# Patient Record
Sex: FEMALE | Race: OTHER | HISPANIC OR LATINO | URBAN - METROPOLITAN AREA
[De-identification: names, ages, dates, MRNs, and addresses within clinical notes are randomized per-mention and may not be internally consistent; named-entity substitution may affect disease eponyms.]

---

## 2022-07-09 ENCOUNTER — TELEPHONE ENCOUNTER (OUTPATIENT)
Dept: URBAN - METROPOLITAN AREA CLINIC 121 | Facility: CLINIC | Age: 67
End: 2022-07-09

## 2022-07-09 RX ORDER — ESOMEPRAZOLE MAGNESIUM 20 MG/1
CAPSULE, DELAYED RELEASE ORAL TWICE A DAY
Refills: 0 | OUTPATIENT
Start: 2018-07-17 | End: 2018-10-09

## 2022-07-09 RX ORDER — OMEPRAZOLE 40 MG/1
CAPSULE, DELAYED RELEASE ORAL TWICE A DAY
Refills: 0 | OUTPATIENT
Start: 2018-04-26 | End: 2018-04-26

## 2022-07-09 RX ORDER — ESOMEPRAZOLE MAGNESIUM 20 MG/1
TWICE A DAY CAPSULE, DELAYED RELEASE ORAL TWICE A DAY
Refills: 1 | OUTPATIENT
Start: 2018-06-27 | End: 2018-07-17

## 2022-07-09 RX ORDER — SUCRALFATE 1 G/1
TABLET ORAL THREE TIMES A DAY
Refills: 0 | OUTPATIENT
Start: 2018-07-17 | End: 2018-10-09

## 2022-07-09 RX ORDER — OMEPRAZOLE 40 MG/1
CAPSULE, DELAYED RELEASE ORAL TWICE A DAY
Refills: 0 | OUTPATIENT
Start: 2018-03-29 | End: 2018-04-26

## 2022-07-09 RX ORDER — OMEPRAZOLE 40 MG/1
TWICE A DAY CAPSULE, DELAYED RELEASE ORAL TWICE A DAY
Refills: 1 | OUTPATIENT
Start: 2018-01-29 | End: 2018-03-29

## 2022-07-09 RX ORDER — BUSPIRONE HYDROCHLORIDE 10 MG/1
TABLET ORAL TWICE A DAY
Refills: 0 | OUTPATIENT
Start: 2018-01-09 | End: 2018-03-29

## 2022-07-09 RX ORDER — DEXLANSOPRAZOLE 60 MG/1
ONCE A DAY CAPSULE, DELAYED RELEASE ORAL ONCE A DAY
Refills: 3 | OUTPATIENT
Start: 2018-06-21 | End: 2018-07-17

## 2022-07-09 RX ORDER — CLARITHROMYCIN 500 MG/1
TABLET ORAL THREE TIMES A DAY
Refills: 0 | OUTPATIENT
Start: 2018-01-26 | End: 2018-03-29

## 2022-07-09 RX ORDER — BUSPIRONE HYDROCHLORIDE 10 MG/1
TABLET ORAL TWICE A DAY
Refills: 0 | OUTPATIENT
Start: 2018-03-29 | End: 2018-03-29

## 2022-07-09 RX ORDER — OMEPRAZOLE 40 MG/1
CAPSULE, DELAYED RELEASE ORAL
Refills: 0 | OUTPATIENT
Start: 2018-01-26 | End: 2018-01-29

## 2022-07-09 RX ORDER — OMEPRAZOLE 20 MG/1
TWICE A DAY CAPSULE, DELAYED RELEASE ORAL TWICE A DAY
Refills: 2 | OUTPATIENT
Start: 2018-03-29 | End: 2018-04-26

## 2022-07-09 RX ORDER — SUCRALFATE 1 G/1
TWICE A DAY TABLET ORAL TWICE A DAY
Refills: 0 | OUTPATIENT
Start: 2018-03-16 | End: 2018-03-29

## 2022-07-09 RX ORDER — BISMUTH SUBCITRATE POTASSIUM, METRONIDAZOLE, TETRACYCLINE HYDROCHLORIDE 140; 125; 125 MG/1; MG/1; MG/1
TAKE AS DIRECTED CAPSULE ORAL TAKE AS DIRECTED
Refills: 0 | OUTPATIENT
Start: 2018-01-29 | End: 2018-03-29

## 2022-07-09 RX ORDER — PREDNISONE 10 MG/1
TABLET ORAL TWICE A DAY
Refills: 0 | OUTPATIENT
Start: 2018-01-09 | End: 2018-03-29

## 2022-07-10 ENCOUNTER — TELEPHONE ENCOUNTER (OUTPATIENT)
Dept: URBAN - METROPOLITAN AREA CLINIC 121 | Facility: CLINIC | Age: 67
End: 2022-07-10

## 2022-07-10 RX ORDER — SUCRALFATE 1 G/1
FOUR TIMES A DAY TABLET ORAL
Refills: 1 | Status: ACTIVE | COMMUNITY
Start: 2018-04-26

## 2022-07-10 RX ORDER — BISMUTH SUBCITRATE POTASSIUM, METRONIDAZOLE, TETRACYCLINE HYDROCHLORIDE 140; 125; 125 MG/1; MG/1; MG/1
TAKE AS DIRECTED CAPSULE ORAL TAKE AS DIRECTED
Refills: 0 | Status: ACTIVE | COMMUNITY
Start: 2018-02-06

## 2022-07-10 RX ORDER — ESOMEPRAZOLE MAGNESIUM 20 MG/1
TWICE A DAY CAPSULE, DELAYED RELEASE ORAL TWICE A DAY
Refills: 2 | Status: ACTIVE | COMMUNITY
Start: 2018-08-24

## 2022-07-10 RX ORDER — SUCRALFATE 1 G/1
THREE TIMES A DAY TABLET ORAL THREE TIMES A DAY
Refills: 0 | Status: ACTIVE | COMMUNITY
Start: 2018-03-29

## 2023-02-21 ENCOUNTER — OFFICE VISIT (OUTPATIENT)
Dept: URBAN - METROPOLITAN AREA CLINIC 63 | Facility: CLINIC | Age: 68
End: 2023-02-21
Payer: COMMERCIAL

## 2023-02-21 VITALS
BODY MASS INDEX: 23.05 KG/M2 | TEMPERATURE: 97.1 F | HEIGHT: 60 IN | HEART RATE: 83 BPM | DIASTOLIC BLOOD PRESSURE: 80 MMHG | WEIGHT: 117.4 LBS | OXYGEN SATURATION: 99 % | SYSTOLIC BLOOD PRESSURE: 132 MMHG

## 2023-02-21 DIAGNOSIS — K21.9 GERD WITHOUT ESOPHAGITIS: ICD-10-CM

## 2023-02-21 DIAGNOSIS — Z86.010 PERSONAL HISTORY OF COLONIC POLYPS: ICD-10-CM

## 2023-02-21 PROCEDURE — 99204 OFFICE O/P NEW MOD 45 MIN: CPT | Performed by: INTERNAL MEDICINE

## 2023-02-21 RX ORDER — MIRTAZAPINE 30 MG/1
1 TABLET AT BEDTIME TABLET, FILM COATED ORAL ONCE A DAY
Status: ACTIVE | COMMUNITY

## 2023-02-21 RX ORDER — ALPRAZOLAM 1 MG/1
1 TABLET TABLET ORAL ONCE A DAY
Status: ACTIVE | COMMUNITY

## 2023-02-21 RX ORDER — SERTRALINE 25 MG/1
1 TABLET TABLET, FILM COATED ORAL ONCE A DAY
Status: ACTIVE | COMMUNITY

## 2023-02-21 RX ORDER — ONDANSETRON HYDROCHLORIDE 4 MG/1
1 TABLET TABLET, FILM COATED ORAL
Qty: 2 | Refills: 0 | OUTPATIENT
Start: 2023-02-21

## 2023-02-21 NOTE — HPI-SCREENING
10/17 Patient here today in better general state, she said has better appetite, symptoms of gastritis and reflux are better, here today for her surveillance colonoscopy. Patient's  last colonoscopy with evidence of diverticular disease, internal hemorrhoids, 4 hyperplastic polyps and bad preparation. Patient still having CEA elevated with normal  and AFP.Normal lipase, amylase and pancreatic elastase. Patient will continue diet , treatment for gastritis and will have colonoscopy, we went over the colon prep on detail and she said understood. Will repeat CEA in a month. and will do CT abdomen upon it results.     Anti-reflux diet  Reduce caffeine intake  Ordered regular exercise  Abstinence from alcohol  Maintain a healthy diet  Lose weight  Avoid certain foods:  Fatty, fried, greasy,spicy foods  Avoid certain foods: citrus 4/18 Patient  has been loosing lots of weight. She said has appetite but she can not eat, because every time she eats she get epigastric pain.  Also, complaints of constipation. Patient's  colonoscopy with evidence of diverticular disease, internal hemorrhoids, 4 hyperplastic polyps and bad preparation. Esophagram with evidence of mild dysmotility and stasis of barium within the esophagus. ( She denies dysphagia ) Patient had visit [facility] ER department due to this symptoms and had done a CT scan that result in a sub optimal study. Will repeat it upon clinical course and  studies results. We will start Dexilant, Linzess. diet, continue Carafate. Will do SBFT and GES. 7/18 Patient here today in better general state, she said her symptoms of gastritis and reflux are better, are related now with her meals, abdominal pain is better. Constipation is improved with fiber intake. Patient did not complete GES or SBFT. Labs: AFP, CMP, Amylase and lipase are normal. CEA is elevated 9.1 ( Patient smoke ). Plan Will repeat tumor marker  and CEA, will do CT abdomen with contrast upon labs results and clinical status. patient will continue diet and treatment with PPI and Carafate. 2/23: Patient with personal h/o colonic polyps, ast colonoscopy done in 2018, was recommended to repeat colonoscopy in 6 months because of poor preparation, she did not follow up with us until now.  I explained to the patient that I was not able to removed the polyp and she understood, but did not had any follow up with us. Now comes with acid reflux feels better doing diet, will plan a colonoscopy. Patient taking savila, doing better. Will plan EGD and colonoscopy.  patiwent continue smoking, was recommended to stop.

## 2023-02-28 ENCOUNTER — LAB OUTSIDE AN ENCOUNTER (OUTPATIENT)
Dept: URBAN - METROPOLITAN AREA CLINIC 63 | Facility: CLINIC | Age: 68
End: 2023-02-28

## 2023-03-20 ENCOUNTER — TELEPHONE ENCOUNTER (OUTPATIENT)
Dept: URBAN - METROPOLITAN AREA CLINIC 63 | Facility: CLINIC | Age: 68
End: 2023-03-20

## 2023-04-07 ENCOUNTER — OFFICE VISIT (OUTPATIENT)
Dept: URBAN - METROPOLITAN AREA SURGERY CENTER 4 | Facility: SURGERY CENTER | Age: 68
End: 2023-04-07
Payer: COMMERCIAL

## 2023-04-07 DIAGNOSIS — Z86.010 PERSONAL HISTORY OF COLONIC POLYPS: ICD-10-CM

## 2023-04-07 DIAGNOSIS — K21.00 GASTRO-ESOPHAGEAL REFLUX DISEASE WITH ESOPHAGITIS, WITHOUT BLEEDING: ICD-10-CM

## 2023-04-07 DIAGNOSIS — D12.2 POLYP OF ASCENDING COLON: ICD-10-CM

## 2023-04-07 DIAGNOSIS — K63.89 OTHER SPECIFIED DISEASES OF INTESTINE: ICD-10-CM

## 2023-04-07 DIAGNOSIS — D12.3 POLYP OF TRANSVERSE COLON: ICD-10-CM

## 2023-04-07 DIAGNOSIS — K29.50 UNSPECIFIED CHRONIC GASTRITIS WITHOUT BLEEDING: ICD-10-CM

## 2023-04-07 DIAGNOSIS — K62.1 RECTAL POLYP: ICD-10-CM

## 2023-04-07 DIAGNOSIS — R19.8 OTHER SPECIFIED SYMPTOMS AND SIGNS INVOLVING THE DIGESTIVE SYSTEM AND ABDOMEN: ICD-10-CM

## 2023-04-07 PROCEDURE — 43239 EGD BIOPSY SINGLE/MULTIPLE: CPT | Performed by: INTERNAL MEDICINE

## 2023-04-07 PROCEDURE — 45380 COLONOSCOPY AND BIOPSY: CPT | Performed by: INTERNAL MEDICINE

## 2023-04-07 PROCEDURE — 45385 COLONOSCOPY W/LESION REMOVAL: CPT | Performed by: INTERNAL MEDICINE

## 2023-04-07 RX ORDER — SERTRALINE 25 MG/1
1 TABLET TABLET, FILM COATED ORAL ONCE A DAY
Status: ACTIVE | COMMUNITY

## 2023-04-07 RX ORDER — ALPRAZOLAM 1 MG/1
1 TABLET TABLET ORAL ONCE A DAY
Status: ACTIVE | COMMUNITY

## 2023-04-07 RX ORDER — DEXLANSOPRAZOLE 60 MG/1
1 CAPSULE CAPSULE, DELAYED RELEASE ORAL ONCE A DAY
Qty: 30 | OUTPATIENT
Start: 2023-04-07

## 2023-04-07 RX ORDER — MIRTAZAPINE 30 MG/1
1 TABLET AT BEDTIME TABLET, FILM COATED ORAL ONCE A DAY
Status: ACTIVE | COMMUNITY

## 2023-04-07 RX ORDER — ONDANSETRON HYDROCHLORIDE 4 MG/1
1 TABLET TABLET, FILM COATED ORAL
Qty: 2 | Refills: 0 | Status: ACTIVE | COMMUNITY
Start: 2023-02-21

## 2023-04-24 ENCOUNTER — OFFICE VISIT (OUTPATIENT)
Dept: URBAN - METROPOLITAN AREA CLINIC 63 | Facility: CLINIC | Age: 68
End: 2023-04-24
Payer: COMMERCIAL

## 2023-04-24 VITALS
WEIGHT: 114.8 LBS | SYSTOLIC BLOOD PRESSURE: 130 MMHG | OXYGEN SATURATION: 99 % | DIASTOLIC BLOOD PRESSURE: 70 MMHG | HEART RATE: 83 BPM | TEMPERATURE: 97.3 F | HEIGHT: 60 IN | BODY MASS INDEX: 22.54 KG/M2

## 2023-04-24 DIAGNOSIS — K21.9 GERD WITHOUT ESOPHAGITIS: ICD-10-CM

## 2023-04-24 DIAGNOSIS — K44.9 HIATAL HERNIA: ICD-10-CM

## 2023-04-24 DIAGNOSIS — K22.70 BARRETT'S ESOPHAGUS WITHOUT DYSPLASIA: ICD-10-CM

## 2023-04-24 DIAGNOSIS — K59.04 CHRONIC IDIOPATHIC CONSTIPATION: ICD-10-CM

## 2023-04-24 DIAGNOSIS — K64.1 GRADE II HEMORRHOIDS: ICD-10-CM

## 2023-04-24 DIAGNOSIS — D12.6 ADENOMATOUS POLYP OF COLON, UNSPECIFIED PART OF COLON: ICD-10-CM

## 2023-04-24 PROBLEM — 82934008: Status: ACTIVE | Noted: 2023-04-24

## 2023-04-24 PROBLEM — 302914006: Status: ACTIVE | Noted: 2023-04-24

## 2023-04-24 PROCEDURE — 99214 OFFICE O/P EST MOD 30 MIN: CPT | Performed by: NURSE PRACTITIONER

## 2023-04-24 RX ORDER — OMEPRAZOLE 20 MG/1
1 CAPSULE 30 MINUTES BEFORE MORNING MEAL CAPSULE, DELAYED RELEASE ORAL ONCE A DAY
Qty: 90 CAPSULES | Refills: 0 | OUTPATIENT
Start: 2023-04-24

## 2023-04-24 RX ORDER — SERTRALINE 25 MG/1
1 TABLET TABLET, FILM COATED ORAL ONCE A DAY
Status: ACTIVE | COMMUNITY

## 2023-04-24 RX ORDER — ALPRAZOLAM 1 MG/1
1 TABLET TABLET ORAL ONCE A DAY
Status: ACTIVE | COMMUNITY

## 2023-04-24 RX ORDER — SUCRALFATE 1 G/1
1 TABLET ON AN EMPTY STOMACH TABLET ORAL TWICE A DAY
Qty: 180 TABLET | Refills: 0 | OUTPATIENT
Start: 2023-04-24 | End: 2023-05-24

## 2023-04-24 RX ORDER — MIRTAZAPINE 30 MG/1
1 TABLET AT BEDTIME TABLET, FILM COATED ORAL ONCE A DAY
Status: ACTIVE | COMMUNITY

## 2023-04-24 RX ORDER — ONDANSETRON HYDROCHLORIDE 4 MG/1
1 TABLET TABLET, FILM COATED ORAL
Qty: 2 | Refills: 0 | Status: ACTIVE | COMMUNITY
Start: 2023-02-21

## 2023-04-24 NOTE — HPI-SCREENING
10/17 Patient here today in better general state, she said has better appetite, symptoms of gastritis and reflux are better, here today for her surveillance colonoscopy. Patient's  last colonoscopy with evidence of diverticular disease, internal hemorrhoids, 4 hyperplastic polyps and bad preparation. Patient still having CEA elevated with normal  and AFP.Normal lipase, amylase and pancreatic elastase. Patient will continue diet, treatment for gastritis and will have colonoscopy, we went over the colon prep on detail, and she said understood. Will repeat CEA in a month. And will do CT abdomen upon it results.     Anti-reflux diet  Reduce caffeine intake  Ordered regular exercise  Abstinence from alcohol  Maintain a healthy diet  Lose weight  Avoid certain foods:  Fatty, fried, greasy, spicy foods  Avoid certain foods: citrus 4/18 Patient  has been loosing lots of weight. She said has appetite, but she can not eat, because every time she eats she gets epigastric pain.  Also, complaints of constipation. Patient's  colonoscopy with evidence of diverticular disease, internal hemorrhoids, 4 hyperplastic polyps and bad preparation. Esophagram with evidence of mild dysmotility and stasis of barium within the esophagus. (She denies dysphagia) Patient had visit [facility] ER department due to these symptoms and had done a CT scan that result in a suboptimal study. Will repeat it upon clinical course and  studies results. We will start Dexilant, Linzess. Diet, continue Carafate. Will do SBFT and GES. 7/18 Patient here today in better general state, she said her symptoms of gastritis and reflux are better, are related now with her meals, abdominal pain is better. Constipation is improved with fiber intake. Patient did not complete GES or SBFT. Labs: AFP, CMP, Amylase and lipase are normal. CEA is elevated 9.1 (Patient smoke). Plan Will repeat tumor marker  and CEA, will do CT abdomen with contrast upon labs results and clinical status. Patient will continue diet and treatment with PPI and Carafate. 2/23: Patient with personal h/o colonic polyps, last colonoscopy done in 2018, was recommended to repeat colonoscopy in 6 months because of poor preparation, she did not follow up with us until now.  I explained to the patient that I was not able to remove the polyp, and she understood, but did not have any follow up with us. Now comes with acid reflux feels better doing diet, will plan a colonoscopy. Patient taking savila, doing better. Will plan EGD and colonoscopy.  Patient continue smoking, was recommended to stop. 4/23 Patient's EGD shows evidence of an 8 mm sessile serrated polyp into the ascending colon, a 6 mm hyperplastic polyp into the transverse colon, a 5 mm sessile serrated polyp into the descending colon, four 5 mm hyperplastic polyp into the rectum, internal hemorrhoids, melanosis in the colon, and stool in the entire examined colon.  EGD shows evidence of LA grade a reflux esophagitis.  Small hiatal hernia.  Chronic gastritis.  Normal upper third esophagus and middle third esophagus, normal examined duodenum.  Biopsy results shows evidence of duodenal mucosa with no histologic changes, Stomach antrum/body biopsy negative for H. pylori.  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  EGD surveillance in 1 year.  Patient will continue with diet and treatment for gastritis. Stop tabaco use and diet.

## 2023-04-27 ENCOUNTER — TELEPHONE ENCOUNTER (OUTPATIENT)
Dept: URBAN - METROPOLITAN AREA CLINIC 64 | Facility: CLINIC | Age: 68
End: 2023-04-27

## 2023-06-01 ENCOUNTER — TELEPHONE ENCOUNTER (OUTPATIENT)
Dept: URBAN - METROPOLITAN AREA CLINIC 63 | Facility: CLINIC | Age: 68
End: 2023-06-01

## 2023-06-02 ENCOUNTER — OUT OF OFFICE VISIT (OUTPATIENT)
Dept: URBAN - METROPOLITAN AREA SURGERY CENTER 4 | Facility: SURGERY CENTER | Age: 68
End: 2023-06-02
Payer: COMMERCIAL

## 2023-06-02 DIAGNOSIS — Z86.010 PERSONAL HISTORY OF COLONIC POLYPS: ICD-10-CM

## 2023-06-02 PROCEDURE — 00812 ANES LWR INTST SCR COLSC: CPT | Performed by: NURSE ANESTHETIST, CERTIFIED REGISTERED

## 2023-06-02 PROCEDURE — G0105 COLORECTAL SCRN; HI RISK IND: HCPCS | Performed by: INTERNAL MEDICINE

## 2023-06-02 RX ORDER — OMEPRAZOLE 20 MG/1
1 CAPSULE 30 MINUTES BEFORE MORNING MEAL CAPSULE, DELAYED RELEASE ORAL ONCE A DAY
Qty: 90 CAPSULES | Refills: 0 | Status: ACTIVE | COMMUNITY
Start: 2023-04-24

## 2023-06-02 RX ORDER — ALPRAZOLAM 1 MG/1
1 TABLET TABLET ORAL ONCE A DAY
Status: ACTIVE | COMMUNITY

## 2023-06-02 RX ORDER — SERTRALINE 25 MG/1
1 TABLET TABLET, FILM COATED ORAL ONCE A DAY
Status: ACTIVE | COMMUNITY

## 2023-06-02 RX ORDER — ONDANSETRON HYDROCHLORIDE 4 MG/1
1 TABLET TABLET, FILM COATED ORAL
Qty: 2 | Refills: 0 | Status: ACTIVE | COMMUNITY
Start: 2023-02-21

## 2023-06-02 RX ORDER — MIRTAZAPINE 30 MG/1
1 TABLET AT BEDTIME TABLET, FILM COATED ORAL ONCE A DAY
Status: ACTIVE | COMMUNITY

## 2023-06-28 ENCOUNTER — TELEPHONE ENCOUNTER (OUTPATIENT)
Dept: URBAN - METROPOLITAN AREA CLINIC 63 | Facility: CLINIC | Age: 68
End: 2023-06-28

## 2023-07-03 ENCOUNTER — OFFICE VISIT (OUTPATIENT)
Dept: URBAN - METROPOLITAN AREA CLINIC 63 | Facility: CLINIC | Age: 68
End: 2023-07-03
Payer: COMMERCIAL

## 2023-07-03 ENCOUNTER — OFFICE VISIT (OUTPATIENT)
Dept: URBAN - METROPOLITAN AREA CLINIC 63 | Facility: CLINIC | Age: 68
End: 2023-07-03

## 2023-07-03 VITALS
OXYGEN SATURATION: 98 % | WEIGHT: 110.4 LBS | BODY MASS INDEX: 21.68 KG/M2 | TEMPERATURE: 96.5 F | SYSTOLIC BLOOD PRESSURE: 138 MMHG | HEART RATE: 81 BPM | DIASTOLIC BLOOD PRESSURE: 82 MMHG | HEIGHT: 60 IN

## 2023-07-03 DIAGNOSIS — K59.04 CHRONIC IDIOPATHIC CONSTIPATION: ICD-10-CM

## 2023-07-03 DIAGNOSIS — K21.9 GERD WITHOUT ESOPHAGITIS: ICD-10-CM

## 2023-07-03 DIAGNOSIS — Z86.010 PERSONAL HISTORY OF COLONIC POLYPS: ICD-10-CM

## 2023-07-03 DIAGNOSIS — K44.9 HIATAL HERNIA: ICD-10-CM

## 2023-07-03 PROBLEM — 266433003: Status: ACTIVE | Noted: 2023-07-03

## 2023-07-03 PROBLEM — 84089009: Status: ACTIVE | Noted: 2023-07-03

## 2023-07-03 PROCEDURE — 99214 OFFICE O/P EST MOD 30 MIN: CPT | Performed by: INTERNAL MEDICINE

## 2023-07-03 RX ORDER — MIRTAZAPINE 30 MG/1
1 TABLET AT BEDTIME TABLET, FILM COATED ORAL ONCE A DAY
Status: ACTIVE | COMMUNITY

## 2023-07-03 RX ORDER — SUCRALFATE 1 G/1
1 TABLET ON AN EMPTY STOMACH TABLET ORAL TWICE A DAY
Status: ACTIVE | COMMUNITY

## 2023-07-03 RX ORDER — ALPRAZOLAM 1 MG/1
1 TABLET TABLET ORAL ONCE A DAY
Status: ACTIVE | COMMUNITY

## 2023-07-03 RX ORDER — OMEPRAZOLE 20 MG/1
1 CAPSULE 30 MINUTES BEFORE MORNING MEAL CAPSULE, DELAYED RELEASE ORAL ONCE A DAY
Qty: 90 CAPSULES | Refills: 0 | Status: ACTIVE | COMMUNITY
Start: 2023-04-24

## 2023-07-03 RX ORDER — OMEPRAZOLE 20 MG/1
1 CAPSULE 30 MINUTES BEFORE MORNING MEAL CAPSULE, DELAYED RELEASE ORAL ONCE A DAY
Qty: 30 | Refills: 1 | OUTPATIENT
Start: 2023-04-24

## 2023-07-03 RX ORDER — SUCRALFATE 1 G/1
1 TABLET ON AN EMPTY STOMACH TABLET ORAL TWICE A DAY
Qty: 60 | Refills: 3 | OUTPATIENT

## 2023-07-03 RX ORDER — SERTRALINE 25 MG/1
1 TABLET TABLET, FILM COATED ORAL ONCE A DAY
Status: ACTIVE | COMMUNITY

## 2023-07-03 NOTE — HPI-TODAY'S VISIT:
7/23: Patient feels better, is eating better, with treatment for reflux doing well. Will continue diet and will plan EGD in one year. Will need to continue sucralfate and will use PPI prn. Patient with rare constipation is doing better will conitnue fiber and water. Patient had a por preparation and had an incomplte removal of the ascending polyp will need to do a colonoscopy ASAP.

## 2023-07-03 NOTE — HPI-SCREENING
10/17 Patient here today in better general state, she said has better appetite, symptoms of gastritis and reflux are better, here today for her surveillance colonoscopy. Patient's  last colonoscopy with evidence of diverticular disease, internal hemorrhoids, 4 hyperplastic polyps and bad preparation. Patient still having CEA elevated with normal  and AFP.Normal lipase, amylase and pancreatic elastase. Patient will continue diet, treatment for gastritis and will have colonoscopy, we went over the colon prep on detail, and she said understood. Will repeat CEA in a month. And will do CT abdomen upon it results.     Anti-reflux diet  Reduce caffeine intake  Ordered regular exercise  Abstinence from alcohol  Maintain a healthy diet  Lose weight  Avoid certain foods:  Fatty, fried, greasy, spicy foods  Avoid certain foods: citrus 4/18 Patient  has been loosing lots of weight. She said has appetite, but she can not eat, because every time she eats she gets epigastric pain.  Also, complaints of constipation. Patient's  colonoscopy with evidence of diverticular disease, internal hemorrhoids, 4 hyperplastic polyps and bad preparation. Esophagram with evidence of mild dysmotility and stasis of barium within the esophagus. (She denies dysphagia) Patient had visit [facility] ER department due to these symptoms and had done a CT scan that result in a suboptimal study. Will repeat it upon clinical course and  studies results. We will start Dexilant, Linzess. Diet, continue Carafate. Will do SBFT and GES. 7/18 Patient here today in better general state, she said her symptoms of gastritis and reflux are better, are related now with her meals, abdominal pain is better. Constipation is improved with fiber intake. Patient did not complete GES or SBFT. Labs: AFP, CMP, Amylase and lipase are normal. CEA is elevated 9.1 (Patient smoke). Plan Will repeat tumor marker  and CEA, will do CT abdomen with contrast upon labs results and clinical status. Patient will continue diet and treatment with PPI and Carafate. 2/23: Patient with personal h/o colonic polyps, last colonoscopy done in 2018, was recommended to repeat colonoscopy in 6 months because of poor preparation, she did not follow up with us until now.  I explained to the patient that I was not able to remove the polyp, and she understood, but did not have any follow up with us. Now comes with acid reflux feels better doing diet, will plan a colonoscopy. Patient taking savila, doing better. Will plan EGD and colonoscopy.  Patient continue smoking, was recommended to stop. 4/23 Patient's colonoscopy shows evidence of an 8 mm sessile serrated polyp into the ascending colon, a 6 mm hyperplastic polyp into the transverse colon, a 5 mm sessile serrated polyp into the descending colon, four 5 mm hyperplastic polyp into the rectum, internal hemorrhoids, melanosis in the colon, and stool in the entire examined colon.  EGD shows evidence of LA grade a reflux esophagitis.  Small hiatal hernia.  Chronic gastritis.  Normal upper third esophagus and middle third esophagus, normal examined duodenum.  Biopsy results shows evidence of duodenal mucosa with no histologic changes, Stomach antrum/body biopsy negative for H. pylori.  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  EGD surveillance in 1 year.  Patient will continue with diet and treatment for gastritis. Stop tabaco use and diet.

## 2023-07-10 ENCOUNTER — LAB OUTSIDE AN ENCOUNTER (OUTPATIENT)
Dept: URBAN - METROPOLITAN AREA CLINIC 63 | Facility: CLINIC | Age: 68
End: 2023-07-10

## 2023-07-12 ENCOUNTER — TELEPHONE ENCOUNTER (OUTPATIENT)
Dept: URBAN - METROPOLITAN AREA CLINIC 63 | Facility: CLINIC | Age: 68
End: 2023-07-12

## 2023-07-12 RX ORDER — OMEPRAZOLE 20 MG/1
1 CAPSULE 30 MINUTES BEFORE MORNING MEAL CAPSULE, DELAYED RELEASE ORAL ONCE A DAY
Qty: 90 | Refills: 0
Start: 2023-04-24

## 2023-08-25 ENCOUNTER — OFFICE VISIT (OUTPATIENT)
Dept: URBAN - METROPOLITAN AREA SURGERY CENTER 4 | Facility: SURGERY CENTER | Age: 68
End: 2023-08-25

## 2023-08-25 ENCOUNTER — TELEPHONE ENCOUNTER (OUTPATIENT)
Dept: URBAN - METROPOLITAN AREA CLINIC 63 | Facility: CLINIC | Age: 68
End: 2023-08-25

## 2023-08-25 RX ORDER — OMEPRAZOLE 20 MG/1
1 CAPSULE 30 MINUTES BEFORE MORNING MEAL CAPSULE, DELAYED RELEASE ORAL ONCE A DAY
Qty: 90 | Refills: 0 | Status: ACTIVE | COMMUNITY
Start: 2023-04-24

## 2023-08-25 RX ORDER — SERTRALINE 25 MG/1
1 TABLET TABLET, FILM COATED ORAL ONCE A DAY
Status: ACTIVE | COMMUNITY

## 2023-08-25 RX ORDER — SUCRALFATE 1 G/1
1 TABLET ON AN EMPTY STOMACH TABLET ORAL TWICE A DAY
Qty: 60 | Refills: 3 | Status: ACTIVE | COMMUNITY

## 2023-08-25 RX ORDER — MIRTAZAPINE 30 MG/1
1 TABLET AT BEDTIME TABLET, FILM COATED ORAL ONCE A DAY
Status: ACTIVE | COMMUNITY

## 2023-08-25 RX ORDER — ALPRAZOLAM 1 MG/1
1 TABLET TABLET ORAL ONCE A DAY
Status: ACTIVE | COMMUNITY

## 2023-09-06 ENCOUNTER — OFFICE VISIT (OUTPATIENT)
Dept: URBAN - METROPOLITAN AREA CLINIC 63 | Facility: CLINIC | Age: 68
End: 2023-09-06
Payer: COMMERCIAL

## 2023-09-06 ENCOUNTER — DASHBOARD ENCOUNTERS (OUTPATIENT)
Age: 68
End: 2023-09-06

## 2023-09-06 VITALS
SYSTOLIC BLOOD PRESSURE: 110 MMHG | BODY MASS INDEX: 21.01 KG/M2 | DIASTOLIC BLOOD PRESSURE: 72 MMHG | WEIGHT: 107 LBS | TEMPERATURE: 97 F | HEIGHT: 60 IN | HEART RATE: 69 BPM | OXYGEN SATURATION: 98 %

## 2023-09-06 DIAGNOSIS — D12.6 ADENOMATOUS POLYP OF COLON, UNSPECIFIED PART OF COLON: ICD-10-CM

## 2023-09-06 DIAGNOSIS — K21.9 GERD WITHOUT ESOPHAGITIS: ICD-10-CM

## 2023-09-06 DIAGNOSIS — K22.70 BARRETT'S ESOPHAGUS WITHOUT DYSPLASIA: ICD-10-CM

## 2023-09-06 DIAGNOSIS — K59.04 CHRONIC IDIOPATHIC CONSTIPATION: ICD-10-CM

## 2023-09-06 DIAGNOSIS — K59.00 CONSTIPATION, UNSPECIFIED: ICD-10-CM

## 2023-09-06 PROBLEM — 428054006: Status: ACTIVE | Noted: 2023-09-06

## 2023-09-06 PROCEDURE — 99214 OFFICE O/P EST MOD 30 MIN: CPT | Performed by: INTERNAL MEDICINE

## 2023-09-06 RX ORDER — ALPRAZOLAM 1 MG/1
1 TABLET TABLET ORAL ONCE A DAY
Status: ACTIVE | COMMUNITY

## 2023-09-06 RX ORDER — OMEPRAZOLE 20 MG/1
1 CAPSULE 30 MINUTES BEFORE MORNING MEAL CAPSULE, DELAYED RELEASE ORAL ONCE A DAY
Qty: 30 | Refills: 1 | OUTPATIENT
Start: 2023-04-24

## 2023-09-06 RX ORDER — SERTRALINE 25 MG/1
1 TABLET TABLET, FILM COATED ORAL ONCE A DAY
Status: ACTIVE | COMMUNITY

## 2023-09-06 RX ORDER — SUCRALFATE 1 G/1
1 TABLET ON AN EMPTY STOMACH TABLET ORAL TWICE A DAY
Qty: 60 | Refills: 3 | Status: ACTIVE | COMMUNITY

## 2023-09-06 RX ORDER — OMEPRAZOLE 20 MG/1
1 CAPSULE 30 MINUTES BEFORE MORNING MEAL CAPSULE, DELAYED RELEASE ORAL ONCE A DAY
Qty: 90 | Refills: 0 | Status: ACTIVE | COMMUNITY
Start: 2023-04-24

## 2023-09-06 RX ORDER — MIRTAZAPINE 30 MG/1
1 TABLET AT BEDTIME TABLET, FILM COATED ORAL ONCE A DAY
Status: ACTIVE | COMMUNITY

## 2023-09-06 NOTE — HPI-SCREENING
10/17 Patient here today in better general state, she said has better appetite, symptoms of gastritis and reflux are better, here today for her surveillance colonoscopy. Patient's  last colonoscopy with evidence of diverticular disease, internal hemorrhoids, 4 hyperplastic polyps and bad preparation. Patient still having CEA elevated with normal  and AFP.Normal lipase, amylase and pancreatic elastase. Patient will continue diet, treatment for gastritis and will have colonoscopy, we went over the colon prep on detail, and she said understood. Will repeat CEA in a month. And will do CT abdomen upon it results.     Anti-reflux diet  Reduce caffeine intake  Ordered regular exercise  Abstinence from alcohol  Maintain a healthy diet  Lose weight  Avoid certain foods:  Fatty, fried, greasy, spicy foods  Avoid certain foods: citrus 4/18 Patient  has been loosing lots of weight. She said has appetite, but she can not eat, because every time she eats she gets epigastric pain.  Also, complaints of constipation. Patient's  colonoscopy with evidence of diverticular disease, internal hemorrhoids, 4 hyperplastic polyps and bad preparation. Esophagram with evidence of mild dysmotility and stasis of barium within the esophagus. (She denies dysphagia) Patient had visit [facility] ER department due to these symptoms and had done a CT scan that result in a suboptimal study. Will repeat it upon clinical course and  studies results. We will start Dexilant, Linzess. Diet, continue Carafate. Will do SBFT and GES. 7/18 Patient here today in better general state, she said her symptoms of gastritis and reflux are better, are related now with her meals, abdominal pain is better. Constipation is improved with fiber intake. Patient did not complete GES or SBFT. Labs: AFP, CMP, Amylase and lipase are normal. CEA is elevated 9.1 (Patient smoke). Plan Will repeat tumor marker  and CEA, will do CT abdomen with contrast upon labs results and clinical status. Patient will continue diet and treatment with PPI and Carafate. 2/23: Patient with personal h/o colonic polyps, last colonoscopy done in 2018, was recommended to repeat colonoscopy in 6 months because of poor preparation, she did not follow up with us until now.  I explained to the patient that I was not able to remove the polyp, and she understood, but did not have any follow up with us. Now comes with acid reflux feels better doing diet, will plan a colonoscopy. Patient taking savila, doing better. Will plan EGD and colonoscopy.  Patient continue smoking, was recommended to stop. 4/23 Patient's colonoscopy shows evidence of an 8 mm sessile serrated polyp into the ascending colon, a 6 mm hyperplastic polyp into the transverse colon, a 5 mm sessile serrated polyp into the descending colon, four 5 mm hyperplastic polyp into the rectum, internal hemorrhoids, melanosis in the colon, and stool in the entire examined colon.  EGD shows evidence of LA grade a reflux esophagitis.  Small hiatal hernia.  Chronic gastritis.  Normal upper third esophagus and middle third esophagus, normal examined duodenum.  Biopsy results shows evidence of duodenal mucosa with no histologic changes, Stomach antrum/body biopsy negative for H. pylori.  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  EGD surveillance in 1 year.  Patient will continue with diet and treatment for gastritis. Stop tabaco use and diet. 9/23: Patient with personal h/o colonic polyps ( adenoma) with inadequate preparation will need repeta colonoscopy, patient had tried recently diferent prep with no cleaning of the colono will need two days prep and will do EGd with personal history of Lora's esophagus wit indeterminate or indefinite dysplasia Will plan EGD and colonocopy with use PLENVU preparation

## 2023-09-08 ENCOUNTER — TELEPHONE ENCOUNTER (OUTPATIENT)
Dept: URBAN - METROPOLITAN AREA CLINIC 63 | Facility: CLINIC | Age: 68
End: 2023-09-08

## 2023-09-08 RX ORDER — OMEPRAZOLE 20 MG/1
1 CAPSULE 30 MINUTES BEFORE MORNING MEAL CAPSULE, DELAYED RELEASE ORAL ONCE A DAY
Qty: 30 | Refills: 1
Start: 2023-04-24

## 2023-09-13 ENCOUNTER — LAB OUTSIDE AN ENCOUNTER (OUTPATIENT)
Dept: URBAN - METROPOLITAN AREA CLINIC 63 | Facility: CLINIC | Age: 68
End: 2023-09-13

## 2023-09-25 ENCOUNTER — OFFICE VISIT (OUTPATIENT)
Dept: URBAN - METROPOLITAN AREA CLINIC 63 | Facility: CLINIC | Age: 68
End: 2023-09-25

## 2023-10-27 ENCOUNTER — OFFICE VISIT (OUTPATIENT)
Dept: URBAN - METROPOLITAN AREA SURGERY CENTER 4 | Facility: SURGERY CENTER | Age: 68
End: 2023-10-27

## 2023-11-28 ENCOUNTER — OFFICE VISIT (OUTPATIENT)
Dept: URBAN - METROPOLITAN AREA CLINIC 63 | Facility: CLINIC | Age: 68
End: 2023-11-28

## 2023-12-05 ENCOUNTER — TELEPHONE ENCOUNTER (OUTPATIENT)
Dept: URBAN - METROPOLITAN AREA CLINIC 63 | Facility: CLINIC | Age: 68
End: 2023-12-05

## 2023-12-05 RX ORDER — SUCRALFATE 1 G/1
1 TABLET ON AN EMPTY STOMACH TABLET ORAL TWICE A DAY
Qty: 60 TABLET | Refills: 1

## 2023-12-05 RX ORDER — OMEPRAZOLE 20 MG/1
1 CAPSULE 30 MINUTES BEFORE MORNING MEAL CAPSULE, DELAYED RELEASE ORAL ONCE A DAY
Qty: 30 | Refills: 1
Start: 2023-04-24

## 2023-12-11 ENCOUNTER — WEB ENCOUNTER (OUTPATIENT)
Dept: URBAN - METROPOLITAN AREA CLINIC 63 | Facility: CLINIC | Age: 68
End: 2023-12-11

## 2023-12-11 RX ORDER — SUCRALFATE 1 G/1
TAKE 1 TABLET TABLET ORAL TWICE A DAY
Qty: 180 | Refills: 0

## 2023-12-11 RX ORDER — OMEPRAZOLE 20 MG/1
TAKE 1 CAPSULE CAPSULE, DELAYED RELEASE ORAL ONCE A DAY
Qty: 30 | Refills: 1
Start: 2023-04-24

## 2024-01-23 ENCOUNTER — TELEPHONE ENCOUNTER (OUTPATIENT)
Dept: URBAN - METROPOLITAN AREA CLINIC 63 | Facility: CLINIC | Age: 69
End: 2024-01-23

## 2024-01-23 RX ORDER — SUCRALFATE 1 G/1
TAKE 1 TABLET TABLET ORAL TWICE A DAY
Qty: 180 | Refills: 0
End: 2024-04-22

## 2025-01-09 ENCOUNTER — ERX REFILL RESPONSE (OUTPATIENT)
Dept: URBAN - METROPOLITAN AREA CLINIC 63 | Facility: CLINIC | Age: 70
End: 2025-01-09

## 2025-01-09 RX ORDER — OMEPRAZOLE 20 MG/1
TAKE 1 CAPSULE CAPSULE, DELAYED RELEASE ORAL ONCE A DAY
Qty: 30 | Refills: 1 | OUTPATIENT

## 2025-01-09 RX ORDER — OMEPRAZOLE 20 MG/1
TAKE 1 CAPSULE BY MOUTH EVERY DAY CAPSULE, DELAYED RELEASE ORAL
Qty: 30 CAPSULE | Refills: 0 | OUTPATIENT